# Patient Record
(demographics unavailable — no encounter records)

---

## 2025-02-13 NOTE — REASON FOR VISIT
[Home] : at home, [unfilled] , at the time of the educational consult. [Medical Office: ([facility])___] : at the medical office located in  [Telehealth (audio & video)] : This visit was provided via telehealth using real-time 2-way audio visual technology. [Participant] : the participant [Self] : self [New Patient Visit] : a new patient visit [FreeTextEntry1] : Jugular Vein Stenosis

## 2025-02-13 NOTE — HISTORY OF PRESENT ILLNESS
[de-identified] : Ms. Aguillon is a pleasant 70yo female who presents today with a chief complaint of jugular vein stenosis.  She has a hx of probable CSF leak which were repaired at [facility] with blind blood patches.  Shortly after that, she started having "high pressure symptoms".  She returned to [place] and had a LP with an opening pressure of 16cm H2O.  She states her symptoms resolved for about 36 hours.  She has tried medication (Diamox, Methazolamide and several others) to reduce the pressure without improvement.  Current Symptoms: Severe Head Pressure Cognitive Decline Fatigue

## 2025-02-13 NOTE — REASON FOR VISIT
[Home] : at home, [unfilled] , at the time of the educational consult. [Medical Office: ([facility])___] : at the medical office located in  [Telehealth (audio & video)] : This visit was provided via telehealth using real-time 2-way audio visual technology. [Participant] : the participant [Self] : self [New Patient Visit] : a new patient visit [FreeTextEntry1] : Jugular Vein Stenosis

## 2025-02-13 NOTE — HISTORY OF PRESENT ILLNESS
[de-identified] : Ms. Aguillon is a pleasant 70yo female who presents today with a chief complaint of jugular vein stenosis.  She has a hx of probable CSF leak which were repaired at [facility] with blind blood patches.  Shortly after that, she started having "high pressure symptoms".  She returned to [place] and had a LP with an opening pressure of 16cm H2O.  She states her symptoms resolved for about 36 hours.  She has tried medication (Diamox, Methazolamide and several others) to reduce the pressure without improvement.  Current Symptoms: Severe Head Pressure Cognitive Decline Fatigue

## 2025-02-13 NOTE — ASSESSMENT
[FreeTextEntry1] : Impression: Hx of ? 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 Aguillon describes.  We discussed this finding in detail.  In order to determine the significance of the stenosis, she would need further work up with catheter venogram and balloon test occlusion.  I recommend this only if her symptoms are debilitating and she is considering decompression surgery.    The risks, benefits and alternatives of catheter angiogram were discussed with the patient in detail. In my personal experience, the risks are very rare, but the possibility is not zero. Risks include stroke, brain hemorrhage, any type of disability (facial or extremity weakness, facial or extremity numbness, speech difficulties, blindness) and others. There are also possible complications related to the incisions such as infection, pain, swelling and bleeding.   Recommendations: She will call if She Would like to Proceed with Catheter Venogram/Balloon Test Occlusion If She is Considering coming to [place] for treatment, should have a Telehealth with Dr. James to Discuss Details of Jugular Vein Decompression